# Patient Record
Sex: MALE | Race: WHITE | NOT HISPANIC OR LATINO | Employment: FULL TIME | URBAN - METROPOLITAN AREA
[De-identification: names, ages, dates, MRNs, and addresses within clinical notes are randomized per-mention and may not be internally consistent; named-entity substitution may affect disease eponyms.]

---

## 2019-09-17 PROBLEM — J30.1 SEASONAL ALLERGIC RHINITIS DUE TO POLLEN: Status: ACTIVE | Noted: 2019-09-17

## 2021-05-11 PROBLEM — G47.33 OSA ON CPAP: Status: ACTIVE | Noted: 2020-09-14

## 2021-06-09 DIAGNOSIS — S76.312A RUPTURE OF LEFT HAMSTRING TENDON, INITIAL ENCOUNTER: Primary | ICD-10-CM

## 2022-04-24 ENCOUNTER — IMMUNIZATION (OUTPATIENT)
Dept: PRIMARY CARE CLINIC | Facility: CLINIC | Age: 72
End: 2022-04-24

## 2022-04-24 DIAGNOSIS — Z23 NEED FOR VACCINATION: Primary | ICD-10-CM

## 2022-04-24 PROCEDURE — 91306 COVID-19, MRNA, LNP-S, PF, 100 MCG/0.25 ML DOSE VACCINE (MODERNA BOOSTER): CPT | Mod: PBBFAC | Performed by: INTERNAL MEDICINE

## 2022-04-24 PROCEDURE — 0064A COVID-19, MRNA, LNP-S, PF, 100 MCG/0.25 ML DOSE VACCINE (MODERNA BOOSTER): CPT | Mod: CV19,PBBFAC | Performed by: INTERNAL MEDICINE

## 2022-11-29 ENCOUNTER — PATIENT MESSAGE (OUTPATIENT)
Dept: RESEARCH | Facility: HOSPITAL | Age: 72
End: 2022-11-29

## 2024-06-07 ENCOUNTER — ON-DEMAND VIRTUAL (OUTPATIENT)
Dept: URGENT CARE | Facility: CLINIC | Age: 74
End: 2024-06-07
Payer: MEDICARE

## 2024-06-07 DIAGNOSIS — J01.00 ACUTE NON-RECURRENT MAXILLARY SINUSITIS: Primary | ICD-10-CM

## 2024-06-07 PROCEDURE — 99203 OFFICE O/P NEW LOW 30 MIN: CPT | Mod: 95,,, | Performed by: NURSE PRACTITIONER

## 2024-06-07 RX ORDER — PREDNISONE 20 MG/1
20 TABLET ORAL DAILY
Qty: 3 TABLET | Refills: 0 | Status: SHIPPED | OUTPATIENT
Start: 2024-06-07 | End: 2024-06-10

## 2024-06-07 RX ORDER — AMOXICILLIN AND CLAVULANATE POTASSIUM 875; 125 MG/1; MG/1
1 TABLET, FILM COATED ORAL EVERY 12 HOURS
Qty: 14 TABLET | Refills: 0 | Status: SHIPPED | OUTPATIENT
Start: 2024-06-07 | End: 2024-06-14

## 2024-06-07 NOTE — PROGRESS NOTES
Subjective:      Patient ID: Jerson Zapata is a 74 y.o. male.    Vitals:  vitals were not taken for this visit.     Chief Complaint: Sinus Problem      Visit Type: TELE AUDIOVISUAL    Present with the patient at the time of consultation: TELEMED PRESENT WITH PATIENT: None    Location: Home in [facility]    Past Medical History:   Diagnosis Date    Hyperlipidemia     MJ (obstructive sleep apnea)      Past Surgical History:   Procedure Laterality Date    HERNIA REPAIR      KNEE SURGERY      SINUS SURGERY  08/07/2017    Septo; Turb fracture (50); Antrostomy c ROT (50); Ethmoidectomy: Total(50); Sphenoidectomy c ROT (50); Frontal (50); Extradural     SINUS SURGERY Bilateral 08/17/2020    ANTROSTOMY C ROT; FRONTAL (50); TOTAL ETHMOID W/ SPHENOID ROT (50).    thumb surgery       Review of patient's allergies indicates:   Allergen Reactions    Clindamycin Other (See Comments)     Severe irritation to face      Codeine      nausea     Current Outpatient Medications on File Prior to Visit   Medication Sig Dispense Refill    ascorbic acid, vitamin C, (VITAMIN C) 1000 MG tablet Take 1,000 mg by mouth.      atorvastatin (LIPITOR) 10 MG tablet Take 10 mg by mouth.      glucosamine-chondroitin 500-400 mg tablet Take 1 tablet by mouth.       No current facility-administered medications on file prior to visit.     No family history on file.    Medications Ordered                [id number] - [address] AT [address]   [address] [facility] [address]    Telephone: [phone]   Fax: [phone]   Hours: Not open 24 hours                         E-Prescribed (2 of 2)              amoxicillin-clavulanate 875-125mg (AUGMENTIN) 875-125 mg per tablet    Sig: Take 1 tablet by mouth every 12 (twelve) hours. for 7 days       Start: 6/7/24     Quantity: 14 tablet Refills: 0                         predniSONE (DELTASONE) 20 MG tablet    Sig: Take 1 tablet (20 mg  total) by mouth once daily. for 3 days       Start: 6/7/24     Quantity: 3 tablet Refills: 0                           Ohs Peq Odvv Intake    6/7/2024  9:52 AM CDT - Filed by Patient   What is your current physical address in the event of a medical emergency? [address] [facility], [place]   Are you able to take your vital signs? No   Please attach any relevant images or files          Pt states he traveled internationally. On May 30, started with URI -- coughing, sneezing, runny nose and headache. Then started with sinus congestion, fullness, and pain. Lt ear keeps popping and has headache with thick, purulent discharge. Ibuprofen is not relieving headache pain.     Sinus Problem  This is a new problem. The current episode started 1 to 4 weeks ago. The problem is unchanged. There has been no fever. Associated symptoms include congestion, coughing, headaches, sinus pressure and sneezing. Pertinent negatives include no shortness of breath or sore throat. Past treatments include oral decongestants, acetaminophen and saline nose sprays. The treatment provided mild relief.       Constitution: Negative for fever.   HENT:  Positive for congestion, postnasal drip, sinus pain and sinus pressure. Negative for sore throat.    Respiratory:  Positive for cough. Negative for shortness of breath.    Allergic/Immunologic: Positive for sneezing.   Neurological:  Positive for headaches.        Objective:   The physical exam was conducted virtually.  Physical Exam   Constitutional: He is oriented to person, place, and time. No distress.   HENT:   Head: Normocephalic.   Nose: Purulent discharge and congestion present. Right sinus exhibits maxillary sinus tenderness. Left sinus exhibits maxillary sinus tenderness.   Mouth/Throat: Uvula is midline.   Eyes: Conjunctivae are normal. No scleral icterus.   Pulmonary/Chest: Effort normal. No respiratory distress.   Musculoskeletal: Normal range of motion.         General: Normal range of  "motion.   Neurological: He is alert and oriented to person, place, and time.   Psychiatric: His behavior is normal. Judgment and thought content normal.       Assessment:     1. Acute non-recurrent maxillary sinusitis        Plan:       Acute non-recurrent maxillary sinusitis    Other orders  -     amoxicillin-clavulanate 875-125mg (AUGMENTIN) 875-125 mg per tablet; Take 1 tablet by mouth every 12 (twelve) hours. for 7 days  Dispense: 14 tablet; Refill: 0  -     predniSONE (DELTASONE) 20 MG tablet; Take 1 tablet (20 mg total) by mouth once daily. for 3 days  Dispense: 3 tablet; Refill: 0      Rest. Increase fluid intake.    Runny nose: Ensure you are blowing your nose frequently. It is better to get the nasal discharge out.    Cough: Cover the cough/cough into the elbow and wash hands often to prevent the spread of germs.  A cough may be the last symptom to go away and may persist for up to 4-6 weeks, so do not be alarmed.    Non-Pharmacological Interventions: Consider humidifiers, maintaining hydration, and elevating the head during sleep to alleviate symptoms.    Congestion: OTC nasal saline into each nostril 1-3 times daily. May also use flonase nasal spray. I especially like Xlear nasal spray to help with congestion.    OTC Mucinex twice daily for at least 5 days to help loosen up mucus.    There are a variety of oral OTC decongestants. Beware of decongestants containing phenylephrine. Studies have shown that these do not work to improve your symptoms.    Nasal decongestants (like Afrin) should only be taken for 1-3 days. If you use these longer than this, you are at high right for "rebound congestion" which means you will continue to be congested.    If headache or fever, take OTC tylenol or ibuprofen as needed per the instructions on the label.    Sore throat: OTC Cepacol or Chloraseptic throat lozenges. Warm salt water gargles several times daily.     Sneezing: Antihistamines (Claritin, Allegra, Benadryl, " Zyrtec, Xyzal) help with these symptoms. Benadryl, Zyrtec, and Xyzal may cause drowsiness, so avoid operating any heavy machinery or driving while on these medications.    Report for immediate medical care for symptoms including but not limited to: unremitting fever, severe nasal congestion that makes it hard for you to breathe, nostrils are flaring, skin is pale, wheezing, shortness of breath, chest pain, etc.    Follow-up with your PCP for recheck in 1-3 days.    All questions were answered to the best of my abilities and all concerns were addressed at this time.     Follow up:   1. Please schedule a virtual follow up visit as needed.  2. Please see an in-person provider if your symptoms are worsening or not improving in 2-3 days.  3. Please print a copy of this note and send it to your regular doctor or take it to your next visit so it may be included in your medical record.   4. You must understand that you've received Telehealth Urgent Care treatment only and that you may be released before all your medical problems are known or treated. You, the patient, will arrange for follow up care as instructed.  5. Follow up with your PCP or specialty clinic as directed. To schedule an appointment with the appropriate provider with [facility], please call [phone]. For pediatric referrals, please call [phone]  6. Contact customer support at [phone] for questions or concerns  7. For prescription questions or problems, call [phone] anytime for assistance.  8. For [facility] support, call [phone].

## 2024-10-16 ENCOUNTER — CLINICAL SUPPORT (OUTPATIENT)
Dept: AUDIOLOGY | Facility: CLINIC | Age: 74
End: 2024-10-16
Payer: MEDICARE

## 2024-10-16 ENCOUNTER — OFFICE VISIT (OUTPATIENT)
Dept: OTOLARYNGOLOGY | Facility: CLINIC | Age: 74
End: 2024-10-16
Payer: MEDICARE

## 2024-10-16 DIAGNOSIS — H90.A21 SENSORINEURAL HEARING LOSS (SNHL) OF RIGHT EAR WITH RESTRICTED HEARING OF LEFT EAR: Primary | ICD-10-CM

## 2024-10-16 DIAGNOSIS — H93.11 TINNITUS, SUBJECTIVE, RIGHT: ICD-10-CM

## 2024-10-16 DIAGNOSIS — H91.20 SUDDEN-ONSET SENSORINEURAL HEARING LOSS: Primary | ICD-10-CM

## 2024-10-16 DIAGNOSIS — Z97.4 WEARS HEARING AID: ICD-10-CM

## 2024-10-16 DIAGNOSIS — H61.21 IMPACTED CERUMEN OF RIGHT EAR: ICD-10-CM

## 2024-10-16 PROCEDURE — 92567 TYMPANOMETRY: CPT | Mod: S$GLB,,,

## 2024-10-16 PROCEDURE — 92557 COMPREHENSIVE HEARING TEST: CPT | Mod: S$GLB,,,

## 2024-10-16 PROCEDURE — 99999 PR PBB SHADOW E&M-EST. PATIENT-LVL II: CPT | Mod: PBBFAC,,, | Performed by: NURSE PRACTITIONER

## 2024-10-16 RX ORDER — PREDNISONE 10 MG/1
TABLET ORAL
Qty: 75 TABLET | Refills: 0 | Status: SHIPPED | OUTPATIENT
Start: 2024-10-16 | End: 2024-10-16

## 2024-10-16 RX ORDER — PREDNISONE 10 MG/1
TABLET ORAL
Qty: 75 TABLET | Refills: 0 | Status: SHIPPED | OUTPATIENT
Start: 2024-10-16 | End: 2024-10-31

## 2024-10-16 NOTE — PROGRESS NOTES
Subjective:   Jerson Zapata is a 74 y.o. male who presents for a hearing evaluation. He reports a sudden hearing loss in the right ear on 10/12/24 while running a 6k. Notes sudden change in hearing with associated pressure in the ear. Denies prior similar episodes. He obtained a prescription for steroids which he started on Sunday. Notes this was not a high dose of steroids. He has as a history of high frequency SNHL bilaterally for several years. Has used hearing aids AU x10 years. Reports bilateral tinnitus that has been intermittent and non-bothersome for several years. No acute change in tinnitus with recent episode of hearing loss. Denies recent head trauma. Does recall having a URI 3 week prior to episode. Denies otalgia, otorrhea or vertigo. There is not a family history of hearing loss at a young age. There is not a prior history of ear surgery. There is not a prior history of ear infections. There is not a history of ear trauma. 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 Has had a recent MRI of the brain for headaches which was unremarkable. Has an inspire device.     Past Medical History  He has a past medical history of Hyperlipidemia and MJ (obstructive sleep apnea).    Past Surgical History  He has a past surgical history that includes Knee surgery; thumb surgery; Hernia repair; Sinus surgery (08/07/2017); and Sinus surgery (Bilateral, 08/17/2020).    Family History  His family history is not on file.    Social History  He reports that he has never smoked. He does not have any smokeless tobacco history on file.    Allergies  He is allergic to clindamycin and codeine.    Medications  He has a current medication list which includes the following prescription(s): ascorbic acid (vitamin c), atorvastatin, glucosamine-chondroitin, and prednisone.    Review of Systems     Constitutional: Negative for appetite change, chills, fatigue, fever and unexpected weight loss.      HENT: Positive for  hearing loss, postnasal drip and voice change.      Eyes:  Negative for change in eyesight, eye drainage, eye itching and photophobia.     Respiratory:  Positive for cough and sleep apnea.      Cardiovascular:  Negative for chest pain, foot swelling, irregular heartbeat and swollen veins.     Gastrointestinal:  Negative for abdominal pain, acid reflux, constipation, diarrhea, heartburn and vomiting.     Genitourinary: Negative for difficulty urinating, sexual problems and frequent urination.     Musc: Positive for back pain.     Skin: Negative for rash.     Allergy: Negative for food allergies and seasonal allergies.     Endocrine: Negative for cold intolerance and heat intolerance.      Neurological: Positive for headaches.     Hematologic: Negative for bruises/bleeds easily and swollen glands.      Psychiatric: Negative for decreased concentration, depression, nervous/anxious and sleep disturbance.          Objective:       Head:  Normocephalic and atraumatic. Facial strength is normal.      Ears:    Right Ear: No drainage or swelling. Tympanic membrane is not scarred, not perforated and not erythematous. No middle ear effusion.   Left Ear: No drainage or swelling. Tympanic membrane is not scarred, not perforated and not erythematous.  No middle ear effusion.   Ceruminous debris obstructing right TM, removed via microscopy.    Pulmonary/Chest:   Effort normal.     Psychiatric:   He has a normal mood and affect.     Procedure  Cerumen removal performed.  See procedure note.  The patient was brought to the minor procedure room and placed under the operating microscope of the right ear canal which was cleaned of ceruminous debris. Using a combination of alligators and cup forceps the patient's cerumen was removed. The patient tolerated the procedure well. There were no complications.    Imaging  I independently reviewed the MRI w wo contrast dated 6/6/24.  Pertinent findings: unremarkable.    Audiogram    I  independently reviewed the tracings of the complete audiometric evaluation performed today. I reviewed the audiogram with the patient as well. Pertinent findings include  right ear with severe to profound SNHL and left ear with mild to severe SNHL from 8207-9266 Hz.  Speech discrimination 4% on the right and 100% on the left. Type A tymps bilaterally.    Assessment:     1. Sudden-onset sensorineural hearing loss    2. Wears hearing aid    3. Impacted cerumen of right ear      Plan:     Sudden-onset sensorineural hearing loss  -     predniSONE (DELTASONE) 10 MG tablet; Take 6 tablets (60 mg total) by mouth once daily for 10 days, THEN 5 tablets (50 mg total) once daily for 1 day, THEN 4 tablets (40 mg total) once daily for 1 day, THEN 3 tablets (30 mg total) once daily for 1 day, THEN 2 tablets (20 mg total) once daily for 1 day, THEN 1 tablet (10 mg total) once daily for 1 day.    Discussed the findings from today's exam, including the patient's reported symptoms of sudden unilateral hearing loss confirmed on audiometric testing. While most cases of sudden SNHL are idiopathic, we discussed the possible etiologies: viral, autoimmune, vascular or structural. Treatment includes a course of high dose systemic glucocorticoid steroids. The potential risks and benefits of the oral steroid medication have been discussed with the patient. The risks include, but are not limited to, stomach irritation, increased energy/restless, changes in mood, fluid retention, diabetes, hypertension and allergic reaction. Patient had MRI brain w wo contrast 6/2024 recently which was unremarkable, do not feel another study is necessary at this point. Will have patient follow up with Dr. Lamar in 7-10 days with audiogram to discuss possible IT injection. Patient is agreeable to plan.    Wears hearing aid    Impacted cerumen of right ear  Removed via microscopy.

## 2024-10-16 NOTE — PROGRESS NOTES
"Jerson Zapata was seen today in the clinic for an audiologic evaluation.  Patient's main complaint was a sudden decrease in hearing in the right ear four days ago. 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 He reported he was seen for a virtual visit and prescribed a prednisone dose pack. Dr. Zapata reported a history of bilateral tinnitus, mostly noticeable in quiet. He reported since the change in hearing he is also hearing a "buzzing" sound in his right ear. Elsy Benny is currently fit with MIG[employer]EL [employer] hearing aids binaurally from an outside clinic. He reported a history of occupational (i.e. work as a ) noise exposure, without consistent use of hearing protection when in noise.Dr. Zapata denied otalgia and true vertigo.    Tympanometry revealed Type As in the right ear and Type As in the left ear.     Audiogram results revealed a severe to profound sensorineural hearing loss (SNHL) in the right ear and a mild to severe SNHL from 7642-9535 Hz in the left ear.      Speech reception thresholds were noted at 90 dBHL in the right ear and 20 dBHL in the left ear.    Speech discrimination scores were 4% in the right ear and 100% in the left ear.    Recommendations:  Otologic evaluation  Daily and consistent use of amplification  Repeat audiogram per ENT plan of care  Hearing protection in noise          "

## 2024-10-21 ENCOUNTER — PATIENT MESSAGE (OUTPATIENT)
Dept: OTOLARYNGOLOGY | Facility: CLINIC | Age: 74
End: 2024-10-21
Payer: MEDICARE

## 2024-10-25 ENCOUNTER — CLINICAL SUPPORT (OUTPATIENT)
Dept: AUDIOLOGY | Facility: CLINIC | Age: 74
End: 2024-10-25
Payer: MEDICARE

## 2024-10-25 ENCOUNTER — OFFICE VISIT (OUTPATIENT)
Dept: OTOLARYNGOLOGY | Facility: CLINIC | Age: 74
End: 2024-10-25
Payer: MEDICARE

## 2024-10-25 DIAGNOSIS — H90.A21 SENSORINEURAL HEARING LOSS (SNHL) OF RIGHT EAR WITH RESTRICTED HEARING OF LEFT EAR: Primary | ICD-10-CM

## 2024-10-25 DIAGNOSIS — H91.20 SUDDEN-ONSET SENSORINEURAL HEARING LOSS: Primary | ICD-10-CM

## 2024-10-25 DIAGNOSIS — H93.8X1 SENSATION OF FULLNESS IN RIGHT EAR: ICD-10-CM

## 2024-10-25 DIAGNOSIS — H93.13 TINNITUS OF BOTH EARS: ICD-10-CM

## 2024-10-25 PROCEDURE — 99214 OFFICE O/P EST MOD 30 MIN: CPT | Mod: 25,S$GLB,, | Performed by: OTOLARYNGOLOGY

## 2024-10-25 PROCEDURE — 96372 THER/PROPH/DIAG INJ SC/IM: CPT | Mod: S$GLB,,, | Performed by: OTOLARYNGOLOGY

## 2024-10-25 PROCEDURE — 69801 INCISE INNER EAR: CPT | Mod: RT,S$GLB,, | Performed by: OTOLARYNGOLOGY

## 2024-10-25 PROCEDURE — 1160F RVW MEDS BY RX/DR IN RCRD: CPT | Mod: CPTII,S$GLB,, | Performed by: OTOLARYNGOLOGY

## 2024-10-25 PROCEDURE — 99999 PR PBB SHADOW E&M-EST. PATIENT-LVL I: CPT | Mod: PBBFAC,,, | Performed by: OTOLARYNGOLOGY

## 2024-10-25 PROCEDURE — 1159F MED LIST DOCD IN RCRD: CPT | Mod: CPTII,S$GLB,, | Performed by: OTOLARYNGOLOGY

## 2024-10-25 NOTE — PROGRESS NOTES
Jerson Zapata, a 74 y.o. male, was seen today in the clinic for an audiologic evaluation.  Patient has a history of sudden sensorineural hearing loss in the right ear on 10/12/2024.  Previous audiogram on 10/16/2024 revealed severe to profound sensorineural hearing loss (SNHL) in the right ear and a mild to severe high-frequency SNHL in the left ear.  Mr. Zapata reported minimal change in hearing, tinnitus, and right aural fullness since previous audiogram.  Patient noted the buzzing in his right ear has gotten louder since his previous visit.  Patient reported he was able to hear some sound out of his right hearing aid this morning.  Patient denied dizziness/imbalance.  Mr. Zapata has hearing aids from an outside provider.    Tympanometry revealed Type A in the right ear and Type As in the left ear. Audiogram results revealed normal hearing to severe sensorineural hearing loss in the right ear and normal hearing sloping to severe high-frequency sensorineural hearing loss in the left ear.  Speech reception thresholds were noted at 50 dB in the right ear and 15 dB in the left ear.  Speech discrimination scores were 82% in the right ear (previously 4% on 10/16/2024) and 100% in the left ear.    Recommendations:  Otologic evaluation  Repeat audiogram per ENT plan  Hearing protection when in noise  Return to dispensing audiologist for hearing aid adjustment           100

## 2024-10-30 RX ORDER — DEXAMETHASONE SODIUM PHOSPHATE 10 MG/ML
10 INJECTION INTRAMUSCULAR; INTRAVENOUS ONCE
Status: COMPLETED | OUTPATIENT
Start: 2024-10-30 | End: 2024-10-30

## 2024-10-30 RX ADMIN — DEXAMETHASONE SODIUM PHOSPHATE 10 MG: 10 INJECTION INTRAMUSCULAR; INTRAVENOUS at 01:10

## 2024-11-01 ENCOUNTER — CLINICAL SUPPORT (OUTPATIENT)
Dept: AUDIOLOGY | Facility: CLINIC | Age: 74
End: 2024-11-01
Payer: MEDICARE

## 2024-11-01 ENCOUNTER — OFFICE VISIT (OUTPATIENT)
Dept: OTOLARYNGOLOGY | Facility: CLINIC | Age: 74
End: 2024-11-01
Payer: MEDICARE

## 2024-11-01 DIAGNOSIS — H91.20 SUDDEN-ONSET SENSORINEURAL HEARING LOSS: Primary | ICD-10-CM

## 2024-11-01 DIAGNOSIS — H93.8X1 SENSATION OF FULLNESS IN RIGHT EAR: ICD-10-CM

## 2024-11-01 DIAGNOSIS — H90.A21 SENSORINEURAL HEARING LOSS (SNHL) OF RIGHT EAR WITH RESTRICTED HEARING OF LEFT EAR: Primary | ICD-10-CM

## 2024-11-01 DIAGNOSIS — H93.13 TINNITUS OF BOTH EARS: ICD-10-CM

## 2024-11-01 PROCEDURE — 1159F MED LIST DOCD IN RCRD: CPT | Mod: CPTII,S$GLB,, | Performed by: OTOLARYNGOLOGY

## 2024-11-01 PROCEDURE — 99999 PR PBB SHADOW E&M-EST. PATIENT-LVL II: CPT | Mod: PBBFAC,,, | Performed by: OTOLARYNGOLOGY

## 2024-11-01 PROCEDURE — 96372 THER/PROPH/DIAG INJ SC/IM: CPT | Mod: S$GLB,,, | Performed by: OTOLARYNGOLOGY

## 2024-11-01 PROCEDURE — 1101F PT FALLS ASSESS-DOCD LE1/YR: CPT | Mod: CPTII,S$GLB,, | Performed by: OTOLARYNGOLOGY

## 2024-11-01 PROCEDURE — 1126F AMNT PAIN NOTED NONE PRSNT: CPT | Mod: CPTII,S$GLB,, | Performed by: OTOLARYNGOLOGY

## 2024-11-01 PROCEDURE — 99999 PR PBB SHADOW E&M-EST. PATIENT-LVL I: CPT | Mod: PBBFAC,,,

## 2024-11-01 PROCEDURE — 69801 INCISE INNER EAR: CPT | Mod: RT,S$GLB,, | Performed by: OTOLARYNGOLOGY

## 2024-11-01 PROCEDURE — 99213 OFFICE O/P EST LOW 20 MIN: CPT | Mod: 25,S$GLB,, | Performed by: OTOLARYNGOLOGY

## 2024-11-01 PROCEDURE — 3288F FALL RISK ASSESSMENT DOCD: CPT | Mod: CPTII,S$GLB,, | Performed by: OTOLARYNGOLOGY

## 2024-11-01 RX ADMIN — DEXAMETHASONE SODIUM PHOSPHATE 10 MG: 10 INJECTION INTRAMUSCULAR; INTRAVENOUS at 02:11

## 2024-11-01 NOTE — PROGRESS NOTES
Subjective     Patient ID: Jerson Zapata is a 74 y.o. male.    Chief Complaint: Follow-up (1 wk f/u)    HPI  Jerson Zapata is a 74 y.o. male who presents to clinic for follow up for SSNHL in the right ear. Was seen by Maki Presley last week and was given a high dose steroid taper. He has 4 more days of steroids. Notes some improvement in hearing. Still with some fullness and machinery like sound in the right ear.     10/16/24 visit with Jeane Presley NP:  Jerson Zapata is a 74 y.o. male who presents for a hearing evaluation. He reports a sudden hearing loss in the right ear on 10/12/24 while running a 6k. Notes sudden change in hearing with associated pressure in the ear. Denies prior similar episodes. He obtained a prescription for steroids which he started on Sunday. Notes this was not a high dose of steroids. He has as a history of high frequency SNHL bilaterally for several years. Has used hearing aids [place] x10 years. Reports bilateral tinnitus that has been intermittent and non-bothersome for several years. No acute change in tinnitus with recent episode of hearing loss. Denies recent head trauma. Does recall having a URI 3 week prior to episode. Denies otalgia, otorrhea or vertigo. There is not a family history of hearing loss at a young age. There is not a prior history of ear surgery. There is not a prior history of ear infections. There is not a history of ear trauma. 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 Has had a recent MRI of the brain for headaches which was unremarkable. Has an inspire device.       11/1/24: here for f/u.  Unsure if hearing has improved since first injection. Otherwise no change in sx.    Review of Systems   Constitutional: Negative.    HENT:  Positive for hearing loss and tinnitus. Negative for ear discharge and ear pain.    Eyes: Negative.    Cardiovascular: Negative.    Gastrointestinal: Negative.    Endocrine: Negative.    Genitourinary: Negative.     Musculoskeletal:  Positive for back pain.   Integumentary:  Negative.   Allergic/Immunologic: Negative.    Neurological: Negative.    Hematological: Negative.    Psychiatric/Behavioral: Negative.            Objective   Physical Exam  HENT:      Head: Normocephalic.      Right Ear: Tympanic membrane, ear canal and external ear normal.      Left Ear: Tympanic membrane, ear canal and external ear normal.   Pulmonary:      Effort: Pulmonary effort is normal.   Skin:     General: Skin is warm and dry.         Data reviewed:     I independently reviewed the tracings of the complete audiometric evaluation performed today. I reviewed the audiogram with the patient as well. Pertinent findings include  right ear with severe to profound SNHL and left ear with mild to severe SNHL from 8565-1263 Hz.  Speech discrimination 4% on the right and 100% on the left. Type A tymps bilaterally.          Audiogram reviewed. Some improvement noted since prior audio AD.         Procedure: Transtympanic injection of dexamethasone right  Details:  Transtympanic perfusion of Decadron 10 mg/ ml done after informed consent. Risks, complications, alternatives and goals discussed. Including failure to improve, worsening of hearing, infection, perforation and other potential complications. This was done for a total of 20 minutes. Done with Phenol topical anesthesia under sterile technique with a 1.5 inch 25 gauge needle on a tuberculin syringe. 1 ml of solution perfused into middle ear via the posterior/ superior quadrant and then removed with micro suction.    Patient tolerated well.         Assessment and Plan   1. Sudden-onset sensorineural hearing loss        - second IT dex injection today  - Follow up in one week with audiogram.  -         No follow-ups on file.

## 2024-11-03 RX ORDER — DEXAMETHASONE SODIUM PHOSPHATE 10 MG/ML
10 INJECTION INTRAMUSCULAR; INTRAVENOUS ONCE
Status: COMPLETED | OUTPATIENT
Start: 2024-11-03 | End: 2024-11-01

## 2024-11-08 ENCOUNTER — OFFICE VISIT (OUTPATIENT)
Dept: OTOLARYNGOLOGY | Facility: CLINIC | Age: 74
End: 2024-11-08
Payer: MEDICARE

## 2024-11-08 ENCOUNTER — CLINICAL SUPPORT (OUTPATIENT)
Dept: AUDIOLOGY | Facility: CLINIC | Age: 74
End: 2024-11-08
Payer: MEDICARE

## 2024-11-08 DIAGNOSIS — H91.20 SUDDEN-ONSET SENSORINEURAL HEARING LOSS: Primary | ICD-10-CM

## 2024-11-08 DIAGNOSIS — H90.A21 SENSORINEURAL HEARING LOSS (SNHL) OF RIGHT EAR WITH RESTRICTED HEARING OF LEFT EAR: Primary | ICD-10-CM

## 2024-11-08 DIAGNOSIS — H93.13 TINNITUS, BILATERAL: ICD-10-CM

## 2024-11-08 PROCEDURE — 1126F AMNT PAIN NOTED NONE PRSNT: CPT | Mod: CPTII,S$GLB,, | Performed by: OTOLARYNGOLOGY

## 2024-11-08 PROCEDURE — 99999 PR PBB SHADOW E&M-EST. PATIENT-LVL II: CPT | Mod: PBBFAC,,, | Performed by: OTOLARYNGOLOGY

## 2024-11-08 PROCEDURE — 1159F MED LIST DOCD IN RCRD: CPT | Mod: CPTII,S$GLB,, | Performed by: OTOLARYNGOLOGY

## 2024-11-08 PROCEDURE — 1101F PT FALLS ASSESS-DOCD LE1/YR: CPT | Mod: CPTII,S$GLB,, | Performed by: OTOLARYNGOLOGY

## 2024-11-08 PROCEDURE — 99213 OFFICE O/P EST LOW 20 MIN: CPT | Mod: S$GLB,,, | Performed by: OTOLARYNGOLOGY

## 2024-11-08 PROCEDURE — 99999 PR PBB SHADOW E&M-EST. PATIENT-LVL I: CPT | Mod: PBBFAC,,, | Performed by: AUDIOLOGIST

## 2024-11-08 PROCEDURE — 3288F FALL RISK ASSESSMENT DOCD: CPT | Mod: CPTII,S$GLB,, | Performed by: OTOLARYNGOLOGY

## 2024-11-08 NOTE — PROGRESS NOTES
Jerson Zapata, a 74 y.o. male, was seen today in the clinic for an audiologic evaluation.  Patient has a history of sudden hearing loss in the right ear that happened about a month ago. Patient wears hearing aids that were dispensed at a different center. Patient reported bilateral tinnitus that he has been experiencing for over 20 years. Patient denied vertigo.     Tympanometry revealed Type B in the right ear and Type A in the left ear. Audiogram results revealed mild to moderately-severe sensorineural hearing loss in the right ear and normal to severe sensorineural hearing loss in the left ear.  Speech reception thresholds were noted at 25 dB in the right ear and 20 dB in the left ear.  Speech discrimination scores were 92% in the right ear and 100% in the left ear.    Recommendations:  Otologic evaluation  Annual audiogram  Hearing protection when in noise  Hearing aid adjustment         no

## 2024-11-12 NOTE — PROGRESS NOTES
Subjective     Patient ID: Jerson Zapata is a 74 y.o. male.    Chief Complaint: Follow-up    Follow-up      Jerson Zapata is a 74 y.o. male who presents to clinic for follow up for SSNHL in the right ear. Was seen by Maki Presley last week and was given a high dose steroid taper. He has 4 more days of steroids. Notes some improvement in hearing. Still with some fullness and machinery like sound in the right ear.     10/16/24 visit with Jeane Presley NP:  Jerson Zapata is a 74 y.o. male who presents for a hearing evaluation. He reports a sudden hearing loss in the right ear on 10/12/24 while running a 6k. Notes sudden change in hearing with associated pressure in the ear. Denies prior similar episodes. He obtained a prescription for steroids which he started on Sunday. Notes this was not a high dose of steroids. He has as a history of high frequency SNHL bilaterally for several years. Has used hearing aids AU x10 years. Reports bilateral tinnitus that has been intermittent and non-bothersome for several years. No acute change in tinnitus with recent episode of hearing loss. Denies recent head trauma. Does recall having a URI 3 week prior to episode. Denies otalgia, otorrhea or vertigo. There is not a family history of hearing loss at a young age. There is not a prior history of ear surgery. There is not a prior history of ear infections. There is not a history of ear trauma. 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 Has had a recent MRI of the brain for headaches which was unremarkable. Has an inspire device.       11/1/24: here for f/u.  Unsure if hearing has improved since first injection. Otherwise no change in sx.    11/8/24: here for follow up. Does not feeling hearing has changed since last week.     Review of Systems   Constitutional: Negative.    HENT:  Positive for hearing loss and tinnitus. Negative for ear discharge and ear pain.    Eyes: Negative.    Cardiovascular: Negative.     Gastrointestinal: Negative.    Endocrine: Negative.    Genitourinary: Negative.    Musculoskeletal:  Positive for back pain.   Integumentary:  Negative.   Allergic/Immunologic: Negative.    Neurological: Negative.    Hematological: Negative.    Psychiatric/Behavioral: Negative.            Objective   Physical Exam  HENT:      Head: Normocephalic.      Right Ear: Tympanic membrane, ear canal and external ear normal.      Left Ear: Tympanic membrane, ear canal and external ear normal.   Pulmonary:      Effort: Pulmonary effort is normal.   Skin:     General: Skin is warm and dry.         Data reviewed:     I independently reviewed the tracings of the complete audiometric evaluation performed today. I reviewed the audiogram with the patient as well. Pertinent findings include  right ear with severe to profound SNHL and left ear with mild to severe SNHL from 9802-9051 Hz.  Speech discrimination 4% on the right and 100% on the left. Type A tymps bilaterally.              Audiogram reviewed. Marginal improvement since last week             Assessment and Plan   1. Sudden-onset sensorineural hearing loss    - audio looks stable  - recommend observation  - recheck in 4 months         No follow-ups on file.

## 2025-02-21 ENCOUNTER — CLINICAL SUPPORT (OUTPATIENT)
Dept: OTOLARYNGOLOGY | Facility: CLINIC | Age: 75
End: 2025-02-21
Payer: MEDICARE

## 2025-02-21 ENCOUNTER — OFFICE VISIT (OUTPATIENT)
Dept: OTOLARYNGOLOGY | Facility: CLINIC | Age: 75
End: 2025-02-21
Payer: MEDICARE

## 2025-02-21 VITALS
HEIGHT: 72 IN | HEART RATE: 67 BPM | BODY MASS INDEX: 25.33 KG/M2 | WEIGHT: 187 LBS | SYSTOLIC BLOOD PRESSURE: 158 MMHG | DIASTOLIC BLOOD PRESSURE: 80 MMHG

## 2025-02-21 DIAGNOSIS — H90.3 SENSORINEURAL HEARING LOSS (SNHL) OF BOTH EARS: ICD-10-CM

## 2025-02-21 DIAGNOSIS — H93.13 TINNITUS OF BOTH EARS: ICD-10-CM

## 2025-02-21 DIAGNOSIS — H93.13 TINNITUS, BILATERAL: Primary | ICD-10-CM

## 2025-02-21 DIAGNOSIS — H90.A21 SENSORINEURAL HEARING LOSS (SNHL) OF RIGHT EAR WITH RESTRICTED HEARING OF LEFT EAR: ICD-10-CM

## 2025-02-21 DIAGNOSIS — G47.33 OSA (OBSTRUCTIVE SLEEP APNEA): Primary | ICD-10-CM

## 2025-02-21 DIAGNOSIS — H91.20 SUDDEN-ONSET SENSORINEURAL HEARING LOSS: ICD-10-CM

## 2025-02-21 NOTE — PROGRESS NOTES
History:  Jerson Zapata, a 74 y.o. male, was seen today for an audiologic evaluation. He has a history of right sided sudden sensorineural hearing loss in October with medical management by ENT.  Today he reported intermittent muffling of sound and aural pressure in the right ear. He reported non-intrusive, longstanding, ringing tinnitus in both ears. Patient wears binaural MIGUEL ÁNGEL style hearing aids. Patient denied dizziness.     Results:  Tympanometry revealed Type A tympanogram in the right ear and Type A tympanogram in the left ear.   Pure tone audiometry revealed  mild-moderate sensorineural hearing loss rising to normal hearing sensitivity then sloping to moderately severe sensorineural hearing loss in the right ear and normal hearing sensitivity sloping to severe sensorineural hearing loss in the left ear.  Speech reception thresholds were obtained at 20 dB HL in the right ear and 5 dB HL in the left ear.  Word recognition scores were 92% in the right ear and 100% in the left ear.    Recommendations:  Otologic evaluation today, as scheduled.  Continued daily use of binaural amplification and hearing aid follow ups with dispensing provider as needed.  Use hearing protection when in noise.  Return for follow-up audiologic evaluation annually or sooner if a change in hearing is noted.

## 2025-02-21 NOTE — PROGRESS NOTES
"  Ear, Nose, & Throat  Otolaryngology - Head & Neck Surgery      Subjective:     Chief Complaint:   Chief Complaint   Patient presents with    Hearing Loss       Jerson Zapata is a 74 y.o. male who was self-referred for sudden hearing loss, AD.  Completed a oral corticosteroids course and 2 rounds of intratympanic steroids with Dr. Lamar in November.  Returns today for follow up.  Still has a sensation frequent muffled hearing, fullness, and need to clear his right ear.  Did not have complete recovery of hearing.    Also wishes to discuss inspire implant.  Performed in 2020 with Dr. Rosales in . Needs to establish with sleep medicine clinic.  Feels that the implant is working well.    Per Dr. Lamar  "Jerson Zapata is a 74 y.o. male who presents to clinic for follow up for SSNHL in the right ear. Was seen by Maki Presley last week and was given a high dose steroid taper. He has 4 more days of steroids. Notes some improvement in hearing. Still with some fullness and machinery like sound in the right ear.      10/16/24 visit with Jeane Presley NP:  Jerson Zapata is a 74 y.o. male who presents for a hearing evaluation. He reports a sudden hearing loss in the right ear on 10/12/24 while running a 6k. Notes sudden change in hearing with associated pressure in the ear. Denies prior similar episodes. He obtained a prescription for steroids which he started on Sunday. Notes this was not a high dose of steroids. He has as a history of high frequency SNHL bilaterally for several years. Has used hearing aids AU x10 years. Reports bilateral tinnitus that has been intermittent and non-bothersome for several years. No acute change in tinnitus with recent episode of hearing loss. Denies recent head trauma. Does recall having a URI 3 week prior to episode. Denies otalgia, otorrhea or vertigo. There is not a family history of hearing loss at a young age. There is not a prior history of ear surgery. There is not a prior " "history of ear infections. There is not a history of ear trauma. 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 Has had a recent MRI of the brain for headaches which was unremarkable. Has an inspire device. "    Past Medical History  Active Ambulatory Problems     Diagnosis Date Noted    Seasonal allergic rhinitis due to pollen 09/17/2019    MJ on CPAP 09/14/2020     Resolved Ambulatory Problems     Diagnosis Date Noted    No Resolved Ambulatory Problems     Past Medical History:   Diagnosis Date    Hyperlipidemia     MJ (obstructive sleep apnea)        Past Surgical History  He has a past surgical history that includes Knee surgery; thumb surgery; Hernia repair; Sinus surgery (08/07/2017); and Sinus surgery (Bilateral, 08/17/2020).    Past Surgical History:   Procedure Laterality Date    HERNIA REPAIR      KNEE SURGERY      SINUS SURGERY  08/07/2017    Septo; Turb fracture (50); Antrostomy c ROT (50); Ethmoidectomy: Total(50); Sphenoidectomy c ROT (50); Frontal (50); Extradural     SINUS SURGERY Bilateral 08/17/2020    ANTROSTOMY C ROT; FRONTAL (50); TOTAL ETHMOID W/ SPHENOID ROT (50).    thumb surgery          Family History  His family history is not on file.    Social History  He reports that he has never smoked. He does not have any smokeless tobacco history on file.    Allergies  He is allergic to clindamycin and codeine.    Medications  He has a current medication list which includes the following prescription(s): ascorbic acid (vitamin c), atorvastatin, and glucosamine-chondroitin.    ROS:  Pertinent positive and negative review of systems as noted in HPI.      Objective:     BP (!) 158/80 (BP Location: Left arm, Patient Position: Sitting)   Pulse 67   Ht 6' (1.829 m)   Wt 84.8 kg (187 lb)   BMI 25.36 kg/m²    Constitutional: Well appearing, communicating. No acute distress  Voice: Euphonic  Eyes: Conjunctiva WNL, Pupils reactive  Head/Face: House Brackmann I Bilaterally.  Right Ear: "    Auricle normally developed   EAC: normal   Tympanic membrane: intact   Middle Ear: No effusion present and O[place] in normal position  Left Ear:    Auricle normally developed   EAC: normal   Tympanic membrane: intact   Middle Ear: No effusion present and O[place]icles in normal position  Neck   Lead wire not palpable, well healed scar, no abnormalities  Chest    IPG in good position, well healed  Vestibular:    Spontaneous Nystagmus: none  Neuro/Psychiatric:     Affect: Appropriate   Eyes: EOMI intact  Respiratory: No increased WOB, no stridor       Data Review:   LABS    I have independently reviewed the lab results shown above. Findings significant for the conditions being treated include: none    IMAGING    No pertinent imaging available    AUDIO                    I have independently reviewed the following audiogram and reviewed the report. Findings as follows:     Pure Tone Audiometry: Asymmetric  Right - Normal (0-25 dB) to Severe (71-90 dB) high frequency sensorineural hearing loss   Left - Normal (0-25 dB) to Severe (71-90 dB) high frequency sensorineural hearing loss     Tympanometry  Right: Type A  Left: Type A    Word Discrimination Score  Right: 92%  Left 100%      Procedures:       Assessment:     1. JM (obstructive sleep apnea)    2. Sudden-onset sensorineural hearing loss    3. Sensorineural hearing loss (SNHL) of both ears    4. Tinnitus of both ears    5. Sensorineural hearing loss (SNHL) of right ear with restricted hearing of left ear      Plan:     I had a long discussion with the patient regarding his condition and the further workup and management options.  Appears to have made a considerable recovery in his sudden sensorineural hearing loss he has not returned to baseline.  Discussed the utility of getting an MRI to rule out retrocochlear pathology.  Discussed that these are often negative.  Discussed that in patients with a negative MRI exact etiology is often difficult to find but can  include inflammatory actions is within the nerve due to viral illnesses, microischemic events, there are other etiologies.    We will refer to sleep medicine to reestablish in [place]    Voice recognition software was used in the creation of this note/communication and any sound-alike errors which may have occurred from its use should be taken in context when interpreting. If such errors prevent a clear understanding of the note/communication, please contact the office for clarification.   Problem List Items Addressed This Visit    None  Visit Diagnoses         MJ (obstructive sleep apnea)    -  Primary    Relevant Orders    Ambulatory referral/consult to Sleep Disorders      Sudden-onset sensorineural hearing loss          Sensorineural hearing loss (SNHL) of both ears          Tinnitus of both ears          Sensorineural hearing loss (SNHL) of right ear with restricted hearing of left ear        Relevant Orders    MRI IAC/Temporal Bones W W/O Contrast

## 2025-03-10 ENCOUNTER — HOSPITAL ENCOUNTER (OUTPATIENT)
Dept: RADIOLOGY | Facility: OTHER | Age: 75
Discharge: HOME | End: 2025-03-10
Attending: STUDENT IN AN ORGANIZED HEALTH CARE EDUCATION/TRAINING PROGRAM
Payer: MEDICARE

## 2025-03-10 ENCOUNTER — HOSPITAL ENCOUNTER (OUTPATIENT)
Dept: RADIOLOGY | Facility: HOSPITAL | Age: 75
Discharge: HOME | End: 2025-03-10
Attending: STUDENT IN AN ORGANIZED HEALTH CARE EDUCATION/TRAINING PROGRAM
Payer: MEDICARE

## 2025-03-10 DIAGNOSIS — H90.A21 SENSORINEURAL HEARING LOSS (SNHL) OF RIGHT EAR WITH RESTRICTED HEARING OF LEFT EAR: ICD-10-CM

## 2025-03-10 PROCEDURE — 70553 MRI BRAIN STEM W/O & W/DYE: CPT | Mod: TC

## 2025-03-10 PROCEDURE — 25500020 PHARM REV CODE 255: Performed by: STUDENT IN AN ORGANIZED HEALTH CARE EDUCATION/TRAINING PROGRAM

## 2025-03-10 PROCEDURE — 70553 MRI BRAIN STEM W/O & W/DYE: CPT | Mod: 26,,, | Performed by: STUDENT IN AN ORGANIZED HEALTH CARE EDUCATION/TRAINING PROGRAM

## 2025-03-10 PROCEDURE — A9585 GADOBUTROL INJECTION: HCPCS | Performed by: STUDENT IN AN ORGANIZED HEALTH CARE EDUCATION/TRAINING PROGRAM

## 2025-03-10 RX ORDER — GADOBUTROL 604.72 MG/ML
9 INJECTION INTRAVENOUS
Status: COMPLETED | OUTPATIENT
Start: 2025-03-10 | End: 2025-03-10

## 2025-03-10 RX ADMIN — GADOBUTROL 9 ML: 604.72 INJECTION INTRAVENOUS at 03:03

## 2025-03-13 ENCOUNTER — RESULTS FOLLOW-UP (OUTPATIENT)
Dept: OTOLARYNGOLOGY | Facility: CLINIC | Age: 75
End: 2025-03-13

## 2025-05-06 ENCOUNTER — OFFICE VISIT (OUTPATIENT)
Dept: URGENT CARE | Facility: CLINIC | Age: 75
End: 2025-05-06
Payer: MEDICARE

## 2025-05-06 ENCOUNTER — OCHSNER VIRTUAL EMERGENCY DEPARTMENT (OUTPATIENT)
Facility: CLINIC | Age: 75
End: 2025-05-06
Payer: MEDICARE

## 2025-05-06 ENCOUNTER — PATIENT OUTREACH (OUTPATIENT)
Facility: OTHER | Age: 75
End: 2025-05-06
Payer: MEDICARE

## 2025-05-06 ENCOUNTER — HOSPITAL ENCOUNTER (EMERGENCY)
Facility: OTHER | Age: 75
Discharge: HOME | End: 2025-05-06
Attending: EMERGENCY MEDICINE
Payer: MEDICARE

## 2025-05-06 VITALS
TEMPERATURE: 98 F | HEART RATE: 60 BPM | OXYGEN SATURATION: 100 % | BODY MASS INDEX: 23.7 KG/M2 | HEIGHT: 72 IN | WEIGHT: 175 LBS | RESPIRATION RATE: 17 BRPM | SYSTOLIC BLOOD PRESSURE: 190 MMHG | DIASTOLIC BLOOD PRESSURE: 105 MMHG

## 2025-05-06 VITALS
TEMPERATURE: 99 F | DIASTOLIC BLOOD PRESSURE: 90 MMHG | HEIGHT: 72 IN | SYSTOLIC BLOOD PRESSURE: 163 MMHG | WEIGHT: 177.56 LBS | HEART RATE: 83 BPM | RESPIRATION RATE: 20 BRPM | BODY MASS INDEX: 24.05 KG/M2 | OXYGEN SATURATION: 98 %

## 2025-05-06 DIAGNOSIS — M54.2 NECK PAIN: ICD-10-CM

## 2025-05-06 DIAGNOSIS — R50.9 FEVER, UNSPECIFIED FEVER CAUSE: ICD-10-CM

## 2025-05-06 DIAGNOSIS — R51.9 NONINTRACTABLE HEADACHE, UNSPECIFIED CHRONICITY PATTERN, UNSPECIFIED HEADACHE TYPE: ICD-10-CM

## 2025-05-06 DIAGNOSIS — R03.0 ELEVATED BLOOD PRESSURE READING: ICD-10-CM

## 2025-05-06 DIAGNOSIS — R51.9 ACUTE INTRACTABLE HEADACHE, UNSPECIFIED HEADACHE TYPE: Primary | ICD-10-CM

## 2025-05-06 DIAGNOSIS — R51.9 ACUTE NONINTRACTABLE HEADACHE, UNSPECIFIED HEADACHE TYPE: Primary | ICD-10-CM

## 2025-05-06 DIAGNOSIS — M54.2 NECK PAIN: Primary | ICD-10-CM

## 2025-05-06 LAB
ABSOLUTE EOSINOPHIL (OHS): 0.08 K/UL
ABSOLUTE MONOCYTE (OHS): 0.94 K/UL (ref 0.3–1)
ABSOLUTE NEUTROPHIL COUNT (OHS): 6.09 K/UL (ref 1.8–7.7)
ALBUMIN SERPL BCP-MCNC: 4.2 G/DL (ref 3.5–5.2)
ALP SERPL-CCNC: 69 UNIT/L (ref 40–150)
ALT SERPL W/O P-5'-P-CCNC: 17 UNIT/L (ref 10–44)
ANION GAP (OHS): 12 MMOL/L (ref 8–16)
AST SERPL-CCNC: 27 UNIT/L (ref 11–45)
BASOPHILS # BLD AUTO: 0.05 K/UL
BASOPHILS NFR BLD AUTO: 0.6 %
BILIRUB SERPL-MCNC: 1.2 MG/DL (ref 0.1–1)
BUN SERPL-MCNC: 12 MG/DL (ref 8–23)
CALCIUM SERPL-MCNC: 9.8 MG/DL (ref 8.7–10.5)
CHLORIDE SERPL-SCNC: 98 MMOL/L (ref 95–110)
CO2 SERPL-SCNC: 24 MMOL/L (ref 23–29)
CREAT SERPL-MCNC: 0.9 MG/DL (ref 0.5–1.4)
CTP QC/QA: YES
CTP QC/QA: YES
ERYTHROCYTE [DISTWIDTH] IN BLOOD BY AUTOMATED COUNT: 12.7 % (ref 11.5–14.5)
GFR SERPLBLD CREATININE-BSD FMLA CKD-EPI: >60 ML/MIN/1.73/M2
GLUCOSE SERPL-MCNC: 98 MG/DL (ref 70–110)
GROUP A STREP MOLECULAR (OHS): NEGATIVE
HCT VFR BLD AUTO: 39.3 % (ref 40–54)
HCV AB SERPL QL IA: NEGATIVE
HGB BLD-MCNC: 13.3 GM/DL (ref 14–18)
HIV 1+2 AB+HIV1 P24 AG SERPL QL IA: NEGATIVE
IMM GRANULOCYTES # BLD AUTO: 0.06 K/UL (ref 0–0.04)
IMM GRANULOCYTES NFR BLD AUTO: 0.7 % (ref 0–0.5)
LYMPHOCYTES # BLD AUTO: 1.21 K/UL (ref 1–4.8)
MCH RBC QN AUTO: 32.5 PG (ref 27–31)
MCHC RBC AUTO-ENTMCNC: 33.8 G/DL (ref 32–36)
MCV RBC AUTO: 96 FL (ref 82–98)
NUCLEATED RBC (/100WBC) (OHS): 0 /100 WBC
PLATELET # BLD AUTO: 303 K/UL (ref 150–450)
PMV BLD AUTO: 9.2 FL (ref 9.2–12.9)
POC MOLECULAR INFLUENZA A AGN: NEGATIVE
POC MOLECULAR INFLUENZA B AGN: NEGATIVE
POTASSIUM SERPL-SCNC: 4.4 MMOL/L (ref 3.5–5.1)
PROT SERPL-MCNC: 8.1 GM/DL (ref 6–8.4)
RBC # BLD AUTO: 4.09 M/UL (ref 4.6–6.2)
RELATIVE EOSINOPHIL (OHS): 0.9 %
RELATIVE LYMPHOCYTE (OHS): 14.4 % (ref 18–48)
RELATIVE MONOCYTE (OHS): 11.2 % (ref 4–15)
RELATIVE NEUTROPHIL (OHS): 72.2 % (ref 38–73)
SARS CORONAVIRUS 2 ANTIGEN: NEGATIVE
SODIUM SERPL-SCNC: 134 MMOL/L (ref 136–145)
WBC # BLD AUTO: 8.43 K/UL (ref 3.9–12.7)

## 2025-05-06 PROCEDURE — 85025 COMPLETE CBC W/AUTO DIFF WBC: CPT | Performed by: EMERGENCY MEDICINE

## 2025-05-06 PROCEDURE — 87389 HIV-1 AG W/HIV-1&-2 AB AG IA: CPT | Performed by: EMERGENCY MEDICINE

## 2025-05-06 PROCEDURE — 99285 EMERGENCY DEPT VISIT HI MDM: CPT | Mod: 25

## 2025-05-06 PROCEDURE — 87502 INFLUENZA DNA AMP PROBE: CPT | Mod: QW,S$GLB,, | Performed by: NURSE PRACTITIONER

## 2025-05-06 PROCEDURE — 93010 ELECTROCARDIOGRAM REPORT: CPT | Mod: ,,, | Performed by: INTERNAL MEDICINE

## 2025-05-06 PROCEDURE — 86803 HEPATITIS C AB TEST: CPT | Performed by: EMERGENCY MEDICINE

## 2025-05-06 PROCEDURE — 84155 ASSAY OF PROTEIN SERUM: CPT | Performed by: EMERGENCY MEDICINE

## 2025-05-06 PROCEDURE — 87651 STREP A DNA AMP PROBE: CPT | Performed by: PHYSICIAN ASSISTANT

## 2025-05-06 PROCEDURE — 25000003 PHARM REV CODE 250: Performed by: EMERGENCY MEDICINE

## 2025-05-06 PROCEDURE — 96374 THER/PROPH/DIAG INJ IV PUSH: CPT

## 2025-05-06 PROCEDURE — 63600175 PHARM REV CODE 636 W HCPCS: Mod: JZ,TB | Performed by: EMERGENCY MEDICINE

## 2025-05-06 PROCEDURE — 25500020 PHARM REV CODE 255: Performed by: EMERGENCY MEDICINE

## 2025-05-06 PROCEDURE — 96375 TX/PRO/DX INJ NEW DRUG ADDON: CPT

## 2025-05-06 PROCEDURE — 99213 OFFICE O/P EST LOW 20 MIN: CPT | Mod: 25,S$GLB,, | Performed by: NURSE PRACTITIONER

## 2025-05-06 PROCEDURE — 87811 SARS-COV-2 COVID19 W/OPTIC: CPT | Mod: QW,S$GLB,, | Performed by: NURSE PRACTITIONER

## 2025-05-06 PROCEDURE — 96372 THER/PROPH/DIAG INJ SC/IM: CPT | Mod: S$GLB,,, | Performed by: FAMILY MEDICINE

## 2025-05-06 PROCEDURE — 93005 ELECTROCARDIOGRAM TRACING: CPT

## 2025-05-06 RX ORDER — PROCHLORPERAZINE EDISYLATE 5 MG/ML
10 INJECTION INTRAMUSCULAR; INTRAVENOUS
Status: COMPLETED | OUTPATIENT
Start: 2025-05-06 | End: 2025-05-06

## 2025-05-06 RX ORDER — KETOROLAC TROMETHAMINE 30 MG/ML
30 INJECTION, SOLUTION INTRAMUSCULAR; INTRAVENOUS
Status: COMPLETED | OUTPATIENT
Start: 2025-05-06 | End: 2025-05-06

## 2025-05-06 RX ORDER — METHOCARBAMOL 500 MG/1
1500 TABLET, FILM COATED ORAL 3 TIMES DAILY
Qty: 45 TABLET | Refills: 0 | Status: SHIPPED | OUTPATIENT
Start: 2025-05-06 | End: 2025-05-11

## 2025-05-06 RX ORDER — TAMSULOSIN HYDROCHLORIDE 0.4 MG/1
1 CAPSULE ORAL DAILY
COMMUNITY
Start: 2020-04-04

## 2025-05-06 RX ORDER — ACETAMINOPHEN 500 MG
1000 TABLET ORAL
Status: DISCONTINUED | OUTPATIENT
Start: 2025-05-06 | End: 2025-05-06 | Stop reason: HOSPADM

## 2025-05-06 RX ORDER — DIPHENHYDRAMINE HYDROCHLORIDE 50 MG/ML
25 INJECTION, SOLUTION INTRAMUSCULAR; INTRAVENOUS
Status: COMPLETED | OUTPATIENT
Start: 2025-05-06 | End: 2025-05-06

## 2025-05-06 RX ORDER — KETOROLAC TROMETHAMINE 30 MG/ML
15 INJECTION, SOLUTION INTRAMUSCULAR; INTRAVENOUS
Status: COMPLETED | OUTPATIENT
Start: 2025-05-06 | End: 2025-05-06

## 2025-05-06 RX ADMIN — KETOROLAC TROMETHAMINE 15 MG: 30 INJECTION, SOLUTION INTRAMUSCULAR at 04:05

## 2025-05-06 RX ADMIN — DIPHENHYDRAMINE HYDROCHLORIDE 25 MG: 50 INJECTION INTRAMUSCULAR; INTRAVENOUS at 04:05

## 2025-05-06 RX ADMIN — KETOROLAC TROMETHAMINE 30 MG: 30 INJECTION, SOLUTION INTRAMUSCULAR; INTRAVENOUS at 12:05

## 2025-05-06 RX ADMIN — IOHEXOL 75 ML: 350 INJECTION, SOLUTION INTRAVENOUS at 02:05

## 2025-05-06 RX ADMIN — SODIUM CHLORIDE 1000 ML: 9 INJECTION, SOLUTION INTRAVENOUS at 04:05

## 2025-05-06 RX ADMIN — PROCHLORPERAZINE EDISYLATE 10 MG: 5 INJECTION INTRAMUSCULAR; INTRAVENOUS at 04:05

## 2025-05-06 NOTE — PROGRESS NOTES
Patient seen by Shayna Blackwell NP at urgent care on 5/6/25 for c/o headache; Sathya consulted.     Sathya Provider Dr Raz Lay disposition recommendation patient to go to ED.    Patient currently at [facility] ED.    Follow up scheduled for 5/7/25 to assess for additional needs.

## 2025-05-06 NOTE — PATIENT INSTRUCTIONS
- You must understand that you have received an Urgent Care treatment only and that you may be released before all of your medical problems are known or treated.   - You, the patient, will arrange for follow up care as instructed.   - If your condition worsens or fails to improve we recommend that you receive another evaluation at the ER immediately or contact your PCP to discuss your concerns.   - You can call [phone] or [phone] to help schedule an appointment with the appropriate provider.    HEADACHE     Please go to the emergency room if you experience chest pain, shortness of breath, funny heart beats, headache, blurred vision, weakness in one arm or leg, slurred speech, numbness, inability to walk or talk, confusion.      Try to avoid common triggers of headaches (stress, visual stimuli, weather changes, nitrates, fasting, wine, lack of sleep, smoking, odors, chocolate)      Please return or see your primary care doctor if you develop new or worsening symptoms.

## 2025-05-06 NOTE — PLAN OF CARE-OVED
[facility] Emergency Department Plan of Care Note  Referral Source: Nurse On-Call                               Chief Complaint   Patient presents with    Headache       Recommendation: Emergency Department            Emergency Department: [facility]               Encounter Diagnosis   Name Primary?    Acute nonintractable headache, unspecified headache type Yes

## 2025-05-06 NOTE — PROGRESS NOTES
Subjective:      Patient ID: Jerson Zapata is a 74 y.o. male.    Vitals:  height is 6' (1.829 m) and weight is 80.6 kg (177 lb 9.3 oz). His oral temperature is 99.1 °F (37.3 °C). His blood pressure is 163/90 (abnormal) and his pulse is 83. His respiration is 20 and oxygen saturation is 98%.     Chief Complaint: Headache (Seveerre neck pain, headache, loww grade fever - Entered by patient)    Pt is a 74 year old male c/o headache. Pt states headache, neck pain, and low graded fever. Pt states Saturday he experienced slight neck pain (sharp) and stiffness, but each day it worsen especially this morning, the headache (occipital) (dull and aching, stabbing with certain movements) started Sunday night and has not gone away. Pt states he had a low grade fever (100) this morning. Pt reports slight nausea Monday morning. Pt took ibuprofen and Celebrex, last dose last ibuprofen 400 mg at 5 am.     Headache   This is a new problem. The current episode started in the past 7 days. The problem occurs constantly. The problem has been gradually worsening. The pain is located in the Occipital region. The pain does not radiate. The pain quality is not similar to prior headaches. The quality of the pain is described as aching and dull. The pain is at a severity of 8/10. The pain is severe. Associated symptoms include a fever, nausea and neck pain. Pertinent negatives include no abdominal pain, blurred vision, dizziness, ear pain, eye pain, eye redness, eye watering, loss of balance, numbness, phonophobia, photophobia, rhinorrhea, sinus pressure, tingling, tinnitus, visual change or weakness. Exacerbated by: movement. He has tried NSAIDs (celebrex) for the symptoms. The treatment provided mild relief.       Constitution: Positive for fever.   HENT:  Negative for ear pain, tinnitus and sinus pressure.    Neck: Positive for neck pain.   Eyes:  Negative for eye pain, eye redness, photophobia and blurred vision.   Gastrointestinal:   Positive for nausea. Negative for abdominal pain.   Neurological:  Positive for headaches. Negative for dizziness, loss of balance and numbness.      Objective:     Physical Exam   Constitutional: He is oriented to person, place, and time.   HENT:   Head: Normocephalic.   Ears:   Right Ear: External ear normal.   Left Ear: External ear normal.   Nose: Nose normal.   Mouth/Throat: Mucous membranes are moist.   Eyes: Conjunctivae are normal.   Neck: decreased range of motion (pain w/ extension, B rotation) present. pain with movement present.   Cardiovascular: Normal rate.   Pulmonary/Chest: Effort normal.   Musculoskeletal:      Cervical back: He exhibits tenderness. He exhibits no swelling.   Neurological: no focal deficit. He is alert and oriented to person, place, and time.   Skin: Skin is dry.   Psychiatric: His behavior is normal.   Nursing note and vitals reviewed.    Results for orders placed or performed in visit on 05/06/25   SARS Coronavirus 2 Antigen, POCT Manual Read    Collection Time: 05/06/25 12:29 PM   Result Value Ref Range    SARS Coronavirus 2 Antigen Negative Negative, Presumptive Negative     Acceptable Yes    POCT Influenza A/B MOLECULAR    Collection Time: 05/06/25 12:29 PM   Result Value Ref Range    POC Molecular Influenza A Ag Negative Negative    POC Molecular Influenza B Ag Negative Negative     Acceptable Yes      Assessment:     1. Acute intractable headache, unspecified headache type    2. Neck pain    3. Fever, unspecified fever cause        Plan:   Pt to go to Emergency Department for further evaluation for possible meningitis vs other intracranial infections. Discussed case w/ Dr. Hollingsworth and Sathya who agree. Pt will go to [facility].     Acute intractable headache, unspecified headache type  -     SARS Coronavirus 2 Antigen, POCT Manual Read  -     POCT Influenza A/B MOLECULAR  -     ketorolac injection 30 mg  -     Refer to Emergency Dept.    Neck  pain  -     Refer to Emergency Dept.    Fever, unspecified fever cause      Patient Instructions   - You must understand that you have received an Urgent Care treatment only and that you may be released before all of your medical problems are known or treated.   - You, the patient, will arrange for follow up care as instructed.   - If your condition worsens or fails to improve we recommend that you receive another evaluation at the ER immediately or contact your PCP to discuss your concerns.   - You can call [phone] or [phone] to help schedule an appointment with the appropriate provider.    HEADACHE     Please go to the emergency room if you experience chest pain, shortness of breath, funny heart beats, headache, blurred vision, weakness in one arm or leg, slurred speech, numbness, inability to walk or talk, confusion.      Try to avoid common triggers of headaches (stress, visual stimuli, weather changes, nitrates, fasting, wine, lack of sleep, smoking, odors, chocolate)      Please return or see your primary care doctor if you develop new or worsening symptoms.

## 2025-05-06 NOTE — FIRST PROVIDER EVALUATION
Emergency Department TeleTriage Encounter Note      CHIEF COMPLAINT    Chief Complaint   Patient presents with    Neck Pain     Generalized H/A, neck stiffness x4 days, concerned for meningitis.       VITAL SIGNS   Initial Vitals [05/06/25 1316]   BP Pulse Resp Temp SpO2   (!) 206/103 67 17 98.1 °F (36.7 °C) 97 %      MAP       --            ALLERGIES    Review of patient's allergies indicates:   Allergen Reactions    Clindamycin Other (See Comments)     Severe irritation to face      Codeine      nausea       PROVIDER TRIAGE NOTE  Patient presents with headache, neck stiffness, fever 100 degrees after ibuprofen. No cough or sore throat. Covid and flu negative at urgent care. They sent him to ED with concern about meningitis. He has had 2 viral GI illnesses recently.       ORDERS  Labs Reviewed   HEPATITIS C ANTIBODY   HIV 1 / 2 ANTIBODY   CBC W/ AUTO DIFFERENTIAL    Narrative:     The following orders were created for panel order CBC auto differential.  Procedure                               Abnormality         Status                     ---------                               -----------         ------                     CBC with Differential[[id number]]                                                        Please view results for these tests on the individual orders.   COMPREHENSIVE METABOLIC PANEL   CBC WITH DIFFERENTIAL       ED Orders (720h ago, onward)      Start Ordered     Status Ordering Provider    05/06/25 1325 05/06/25 1324  CBC auto differential  STAT         Ordered MITZI SALGUERO    05/06/25 1325 05/06/25 1324  Comprehensive metabolic panel  STAT         Ordered MITZI SALGUERO    05/06/25 1325 05/06/25 1324  CTA Head and Neck (xpd)  1 time imaging         Ordered MITZI SALGUERO    05/06/25 1325 05/06/25 1324  CBC with Differential  PROCEDURE ONCE         Ordered MITZI SALGUERO    05/06/25 1318 05/06/25 1318  Hepatitis C Antibody  STAT         Ordered MITZI SALGUERO     05/06/25 1318 05/06/25 1318  HIV 1/2 Ag/Ab (4th Gen)  STAT         Ordered MITZI SALGUERO              Virtual Visit Note: The provider triage portion of this emergency department evaluation and documentation was performed via [employer], a HIPAA-compliant telemedicine application, in concert with a tele-presenter in the room. A face to face patient evaluation with one of my colleagues will occur once the patient is placed in an emergency department room.      DISCLAIMER: This note was prepared with [employer] voice recognition transcription software. Garbled syntax, mangled pronouns, and other bizarre constructions may be attributed to that software system.

## 2025-05-06 NOTE — ED TRIAGE NOTES
05/29/18 1500   Over the last 2 weeks, how often have you been bothered by any of the following problems?   Little interest or pleasure in doing things 3   Feeling down, depressed, or hopeless 3   Trouble falling or staying asleep, or sleeping too much 3   Feeling tired or having little energy 2   Poor appetite or overeating 3   Feeling bad about yourself - or that you are a failure or have let yourself or your family down 3   Trouble concentrating on things, such as reading the newspaper or watching television 2   Moving or speaking so slowly that other people could have noticed. Or the opposite - being so fidgety or restless that you have been moving around a lot more than usual 3   Thoughts that you would be better off dead, or of hurting yourself in some way 3   PHQ-9 Total Score 25   If you checked off any problems, how difficult have these problems made it for you to do your work, take care of things at home, or get along with other people? Very difficult      Patient presents to ED with complaints of Neck pain/stiffiness x4 days, states neck hurts when he looks up, down, and both sides, also c/o headache, fever 100 at home, and dizziness, Hx of Sleep Apnea, NAD noted, Pt concerned for Meningitis.

## 2025-05-06 NOTE — ED PROVIDER NOTES
Encounter Date: 5/6/2025       History     Chief Complaint   Patient presents with    Neck Pain     Generalized H/A, neck stiffness x4 days, concerned for meningitis.     74-year-old male presents today with headache and neck pain.  Patient reports symptoms began on Saturday when he woke up with a Crick in his neck on his left side. Patient reports neck pain worse with sudden movements and worse on left side. He states he is able to move his neck although it is painful to do so.  No midline neck pain.  Patient states he feels better to have his neck flexed then extended.  Has taken Celebrex with minimal relief.  Patient reports he began having a headache on Sunday.  Patient states it is a occipital headache that began a day after neck pain. He states HA has worsened and has been persistent since onset.  Mild nausea but no vomiting.  He is still eating and drinking well.  He states he has headache at its worse is a 9/10 and now is a 7/10 since Toradol at urgent care.   Went to [facility] urgent care and receieved toradol and referred to ED for further management.  Of note patient reports he had some diarrhea after [place] last week that lasted a day and a half and resolved.  But has been feeling well up until the neck pain on Saturday.  Additionally  3 wks ago norovirus (diarrhea, vomiting, fever) [place] fest. Those symptoms lasted two days and resolved.  Patient denies any slurred speech, facial asymmetry, vision changes, difficulty walking, unilateral weakness, numbness. Denies fevers, chills, cough, CP, SOB, N/V/D, abdominal pain, dysuria, hematuria or any other complaints.                    Review of patient's allergies indicates:   Allergen Reactions    Clindamycin Other (See Comments)     Severe irritation to face      Codeine      nausea     Past Medical History:   Diagnosis Date    Hyperlipidemia     MJ (obstructive sleep apnea)      Past Surgical History:   Procedure Laterality Date    HERNIA REPAIR       KNEE SURGERY      SINUS SURGERY  08/07/2017    Septo; Turb fracture (50); Antrostomy c ROT (50); Ethmoidectomy: Total(50); Sphenoidectomy c ROT (50); Frontal (50); Extradural     SINUS SURGERY Bilateral 08/17/2020    ANTROSTOMY C ROT; FRONTAL (50); TOTAL ETHMOID W/ SPHENOID ROT (50).    thumb surgery       No family history on file.  Social History[1]  Review of Systems    Physical Exam     Initial Vitals [05/06/25 1316]   BP Pulse Resp Temp SpO2   (!) 206/103 67 17 98.1 °F (36.7 °C) 97 %      MAP       --         Physical Exam    Nursing note and vitals reviewed.  Constitutional: He appears well-developed. No distress.   Nontoxic and well appearing.    HENT:   Head: Normocephalic and atraumatic.   Nose: Nose normal.   Eyes: EOM are normal. Pupils are equal, round, and reactive to light.   Neck: Neck supple. No tracheal deviation present. No JVD present.   Negative Kernig and Brudzinski. Pain over left lateral neck. Able to flex and extend neck but painful to extend.    Cardiovascular:  Normal rate, regular rhythm, normal heart sounds and intact distal pulses.     Exam reveals no gallop and no friction rub.       No murmur heard.  Pulmonary/Chest: Breath sounds normal. No respiratory distress. He has no wheezes. He has no rhonchi. He has no rales.   Abdominal: Abdomen is soft. Bowel sounds are normal. He exhibits no distension. There is no abdominal tenderness. There is no rebound and no guarding.   Musculoskeletal:         General: Normal range of motion.      Cervical back: Neck supple.     Neurological: He is alert and oriented to person, place, and time. He has normal strength. No cranial nerve deficit or sensory deficit. GCS score is 15. GCS eye subscore is 4. GCS verbal subscore is 5. GCS motor subscore is 6.   Cranial nerves II through XII grossly intact. Finger to nose normal. Tone normal. Sens intact to light touch. No drift. No disdiadochokinesia. Strength 5/5 bilaterally upper and lower. Normal gait.  No Romberg. Speech and cognition is normal.  No focal neurologic deficit.     Skin: Skin is warm and dry. Capillary refill takes less than 2 seconds. No rash noted.   Psychiatric: He has a normal mood and affect.         ED Course   Procedures  Labs Reviewed   COMPREHENSIVE METABOLIC PANEL - Abnormal       Result Value    Sodium 134 (*)     Potassium 4.4      Chloride 98      CO2 24      Glucose 98      BUN 12      Creatinine 0.9      Calcium 9.8      Protein Total 8.1      Albumin 4.2      Bilirubin Total 1.2 (*)     ALP 69      AST 27      ALT 17      Anion Gap 12      eGFR >60     CBC WITH DIFFERENTIAL - Abnormal    WBC 8.43      RBC 4.09 (*)     HGB 13.3 (*)     HCT 39.3 (*)     MCV 96      MCH 32.5 (*)     MCHC 33.8      RDW 12.7      Platelet Count 303      MPV 9.2      Nucleated RBC 0      Neut % 72.2      Lymph % 14.4 (*)     Mono % 11.2      Eos % 0.9      Basophil % 0.6      Imm Grans % 0.7 (*)     Neut # 6.09      Lymph # 1.21      Mono # 0.94      Eos # 0.08      Baso # 0.05      Imm Grans # 0.06 (*)    GROUP A STREP, MOLECULAR - Normal    Group A Strep Molecular Negative      Narrative:     Arcanobacterium haemolyticum and Beta Streptococcus group C and G will not be detected by this test method.  Please order Throat Culture ([id number]) if suspected.       HEPATITIS C ANTIBODY - Normal    Hep C Ab Interp Negative     HIV 1 / 2 ANTIBODY - Normal    HIV 1/2 Ag/Ab Negative     CBC W/ AUTO DIFFERENTIAL    Narrative:     The following orders were created for panel order CBC auto differential.  Procedure                               Abnormality         Status                     ---------                               -----------         ------                     CBC with Differential[[id number]]       Abnormal            Final result                 Please view results for these tests on the individual orders.        ECG Results              EKG 12-lead (Final result)        Collection Time Result Time QRS  Duration O[facility] QTC Calculation    05/06/25 13:36:11 05/07/25 08:34:13 76 410                     Final result by Interface, Lab In [facility] (05/07/25 08:34:17)                   Narrative:    Test Reason : R03.0,    Vent. Rate :  68 BPM     Atrial Rate :  68 BPM     P-R Int : 162 ms          QRS Dur :  76 ms      QT Int : 386 ms       P-R-T Axes :  45  26  59 degrees    QTcB Int : 410 ms    Normal sinus rhythm  Nonspecific T wave abnormality  Abnormal ECG    Confirmed by Kirk Gutierrez ([id number]) on 5/7/2025 8:34:12 AM    Referred By: AAAREFERRAL SELF           Confirmed By: Kirk Gutierrez                      Wet Read by Edmund Luna MD (05/06/25 13:51:03, [facility] Emergency Dept, Emergency Medicine)    My EKG interpretation, sinus rhythm, 68 beats per minute, normal axis, no ST segment changes, poor R-wave progression, no previous EKG for comparison                                  Imaging Results              CTA Head and Neck (xpd) (Final result)  Result time 05/06/25 15:45:11      Final result by Roni Moura DO (05/06/25 15:45:11)                   Impression:      CTA head: Unremarkable CTA of the head specifically without evidence for proximal significant stenosis or proximal large vessel occlusion.    CTA neck: Atherosclerotic plaquing of the carotid bifurcations and proximal ICAs with less than 50% proximal ICA stenosis by NASCET criteria.    CT head: No evidence for acute intracranial hemorrhage or definite abnormal parenchymal enhancement.    Clinical correlation and further evaluation as warranted if patient compatible.      Electronically signed by: Roni Moura DO  Date:    05/06/2025  Time:    15:45               Narrative:    EXAMINATION:  CTA HEAD AND NECK (XPD)    CLINICAL HISTORY:  Dizziness, persistent/recurrent, cardiac or vascular cause suspected;    TECHNIQUE:  5 mm axial images of the head pre and post contrast with 0.625 mm axial CTA images of the head neck post-contrast.   Coronal and sagittal MPR and MIP imaging was performed 100 ml of Omnipaque 350 contrast was injected intravenously    COMPARISON:  Stimulator chest    FINDINGS:  CT head with and  without contrast: There is no evidence for acute intracranial hemorrhage or sulcal effacement.  The ventricles are normal in size and configuration without evidence for hydrocephalus.  There is no midline shift or mass effect.  Allowing for CT technique there is no abnormal parenchymal enhancement.  The visualized paranasal sinuses and mastoid air cells are clear.  Continued partially empty sella    CTA head:    Anterior circulation: The bilateral distal cervical, petrous, cavernous, and supraclinoid segments of the ICAs are patent without significant focal stenosis or aneurysm.    The anterior and middle cerebral arteries are patent without focal stenosis or aneurysm.    Posterior circulation: The distal vertebral arteries, basilar artery and posterior cerebral arteries are patent without focal stenosis or aneurysm.    CTA neck: Common origin the right brachiocephalic and left common carotid artery and origin of the left subclavian artery from the arch are within normal limits.    The origin of the vertebral arteries from the respective subclavian arteries are within normal limits.  The vertebral arteries are patent throughout their course without focal stenosis or occlusion.    Right carotid: The right common carotid artery, carotid bifurcation and extracranial portions of the internal carotid artery are patent without significant focal stenosis.    Left carotid: The left common carotid artery, carotid bifurcation and extracranial portions of the internal carotid arteries are patent without significant focal stenosis.    Atherosclerotic plaquing carotid bifurcations and proximal ICAs with less than 50% proximal ICA stenosis by NASCET criteria.    Pharynx/larynx: Evaluation distorted by motion and dental metal artifacts.  Please note hypo  glossal nerve stimulator along the right submandibular gland and duct with lead extending to the right upper ventral chest.  No evidence for focal lesion throughout the pharynx/larynx allowing for scatter artifacts    No consolidation visualized lungs.    Oral cavity and the buccal space     distorted by dental metal.    Glands: Bilateral parotid glands are within normal limits.  No focal submandibular gland lesion with stimulator coursing along the right submandibular gland and duct.  No focal thyroid lesion.    No evidence for adenopathy throughout the neck by size criteria.    Degenerative change of the cervical spine without evidence for acute fracture or traumatic subluxation..  No consolidation in the visualized lungs.                                       Medications   iohexoL (OMNIPAQUE 350) injection 75 mL (75 mLs Intravenous Given 5/6/25 1436)   prochlorperazine injection Soln 10 mg (10 mg Intravenous Given 5/6/25 1609)   diphenhydrAMINE injection 25 mg (25 mg Intravenous Given 5/6/25 1607)   ketorolac injection 15 mg (15 mg Intravenous Given 5/6/25 1611)   sodium chloride 0.9% bolus 1,000 mL 1,000 mL (1,000 mLs Intravenous New Bag 5/6/25 1611)     Medical Decision Making  Problem #1: Most likely 2/2 tension headache, migraine, or headache of non-emergent etiology. No focal neurological symptoms. Neuro exam is benign. Pt is nontoxic. VSS.    Unlikely SAH: headache is non thunderclap. Headache is gradual, non-maximal at onset.  Unlikely Subdural/epidural hematoma: no history of trauma, no anticoagulation.  Unlikely Meningitis: afebrile, no meningismus, no photophobia.  Unlikely Encephalitis: afebrile, no confusion.   Unlikely Temporal arteritis: pt < 60 years old.  no tenderness in temporal area  Unlikely Acute angle glaucoma: PERRL, no eye pain.  Unlikely Carbon Monoxide Poisoning: no other house members with similar symptoms.    Problem #2: Neck pain  Reports he woke up with left sided neck pain. Denies  CP, weakness, slurred speech or numbness. Afebrile. VSS.  Negative Kernig's and Brudzinski sign. Able to flex neck without difficulty. Do not suspect meningitis or encephalitis.    Patient appears to be low risk for complications or other emergent conditions such as anginal equivalent, vickie cervical instability, arterial dissection, osteomyelitis, epidural abscess, central cord syndrome, c-spine fracture, other spinal emergencies    Pt has ttp over left lateral neck and shoulder. Nonfocal neuro exam. No signs of infection. Tolerating PO without difficulty. Do not suspect stroke, atypical MI or other emergent pathology requiring further workup or admission at this time.    Labs reassuring with no leukocytosis or neutrophil predominance. Given severity of headache CTA ordered and negative for aneurysm or bleed or other acute pathology as above. Discussed results with patient and his wife at bedside.  Discussed lumbar puncture with patient and wife and joint decision making was made not to do LP as we do not suspect meningitis or SAH based on exam and history and imaging at this time.     Patient reports he still has a headache.  Blood pressure is elevated which was discussed with the patient suspect likely secondary to pain as no evidence of end organ damage.  However given persistent symptoms offered admission to the patient for further workup and management however declines and prefers to go home which I feel is reasonable.  [facility].  Will prescribe muscle relaxers for symptomatic relief.     Discussed return precautions with patient and his wife at bedside. Return to ER for persistent vomiting, breathing difficulty, fever > 101 without other cause, worsening throat pain, difficulty speaking/swallowing, numbness/weakness in arm(s)/leg(s), increased difficulty awakening, unusual behavior or new concerns / worsening symptoms.  Recommend close PCP follow up in 24 hours and patient will see his orthopedist tomorrow.             Amount and/or Complexity of Data Reviewed  Labs:  Decision-making details documented in ED Course.    Risk  OTC drugs.  Prescription drug management.               ED Course as of 05/07/25 1251   Tue May 06, 2025   1548 Impression:     CTA head: Unremarkable CTA of the head specifically without evidence for proximal significant stenosis or proximal large vessel occlusion.     CTA neck: Atherosclerotic plaquing of the carotid bifurcations and proximal ICAs with less than 50% proximal ICA stenosis by NASCET criteria.     CT head: No evidence for acute intracranial hemorrhage or definite abnormal parenchymal enhancement.     Clinical correlation and further evaluation as warranted if patient compatible.        Electronically signed by:Roni Moura DO  Date:                                            05/06/2025  Time:                                           15:45   [BD]   1615 WBC: 8.43 [BD]   1615 Neut %: 72.2 [BD]   1615 Lymph %(!): 14.4 [BD]   1615 Mono %: 11.2 [BD]      ED Course User Index  [BD] Aris Garcia MD                           Clinical Impression:  Final diagnoses:  [R03.0] Elevated blood pressure reading  [M54.2] Neck pain (Primary)  [R51.9] Nonintractable headache, unspecified chronicity pattern, unspecified headache type          ED Disposition Condition    Discharge Stable          ED Prescriptions       Medication Sig Dispense Start Date End Date Auth. Provider    methocarbamoL (ROBAXIN) 500 MG Tab Take 3 tablets (1,500 mg total) by mouth 3 (three) times daily. for 5 days 45 tablet 5/6/2025 5/11/2025 Aris Garcia MD          Follow-up Information       Follow up With Specialties Details Why Contact Info    Your PCP  Go in 2 days      [facility] Dept Emergency Medicine Go to  As needed, If symptoms worsen [address]  [facility] [address] [phone]                  [1]   Social History  Tobacco Use    Smoking status: Never   Substance Use Topics    Drug use: Never        Aris Garcia MD  05/07/25 7893

## 2025-05-06 NOTE — ED NOTES
Bed: 10  Expected date:   Expected time:   Means of arrival:   Comments:  NOT CLEANED YET  Jerson gramajo

## 2025-05-07 LAB
OHS QRS DURATION: 76 MS
OHS QTC CALCULATION: 410 MS

## 2025-05-08 ENCOUNTER — PATIENT OUTREACH (OUTPATIENT)
Facility: OTHER | Age: 75
End: 2025-05-08
Payer: MEDICARE

## 2025-05-08 NOTE — PROGRESS NOTES
Patient was seen in the Emergency Department on 5/6/25. The After Visit Summary advises patient to follow up with non-[facility] primary care. A follow-up call was placed to assess for additional needs. Patient states that he followed up with an orthopedic surgeon and that all his needs and concerns were addressed.

## 2025-05-27 DIAGNOSIS — G47.33 OSA (OBSTRUCTIVE SLEEP APNEA): Primary | ICD-10-CM

## 2025-05-27 DIAGNOSIS — Z78.9 INTOLERANCE OF CONTINUOUS POSITIVE AIRWAY PRESSURE (CPAP) VENTILATION: ICD-10-CM

## 2025-05-27 PROBLEM — K57.30 DIVERTICULAR DISEASE OF COLON: Status: ACTIVE | Noted: 2025-05-27

## 2025-05-27 PROBLEM — Z86.69 HISTORY OF OBSTRUCTIVE SLEEP APNEA: Status: ACTIVE | Noted: 2022-07-28

## 2025-05-27 PROBLEM — E55.9 VITAMIN D DEFICIENCY: Status: ACTIVE | Noted: 2021-10-28

## 2025-05-27 PROBLEM — E78.00 PURE HYPERCHOLESTEROLEMIA: Status: ACTIVE | Noted: 2017-02-10

## 2025-05-27 PROBLEM — E78.5 HYPERLIPIDEMIA: Status: ACTIVE | Noted: 2025-05-27

## 2025-05-27 PROBLEM — H93.13 TINNITUS OF BOTH EARS: Status: ACTIVE | Noted: 2018-07-06

## 2025-05-27 PROBLEM — J30.89 PERENNIAL ALLERGIC RHINITIS: Status: ACTIVE | Noted: 2017-02-10
